# Patient Record
Sex: FEMALE | Race: BLACK OR AFRICAN AMERICAN | NOT HISPANIC OR LATINO | Employment: OTHER | URBAN - METROPOLITAN AREA
[De-identification: names, ages, dates, MRNs, and addresses within clinical notes are randomized per-mention and may not be internally consistent; named-entity substitution may affect disease eponyms.]

---

## 2023-10-23 PROCEDURE — 99285 EMERGENCY DEPT VISIT HI MDM: CPT | Performed by: EMERGENCY MEDICINE

## 2023-10-23 ASSESSMENT — PAIN SCALES - GENERAL: PAINLEVEL_OUTOF10: 8

## 2023-10-23 ASSESSMENT — PAIN - FUNCTIONAL ASSESSMENT: PAIN_FUNCTIONAL_ASSESSMENT: 0-10

## 2023-10-23 ASSESSMENT — PAIN DESCRIPTION - PAIN TYPE: TYPE: ACUTE PAIN

## 2023-10-23 ASSESSMENT — PAIN DESCRIPTION - ORIENTATION: ORIENTATION: RIGHT;LOWER

## 2023-10-23 ASSESSMENT — PAIN DESCRIPTION - LOCATION: LOCATION: ABDOMEN

## 2023-10-24 ENCOUNTER — APPOINTMENT (OUTPATIENT)
Dept: RADIOLOGY | Facility: HOSPITAL | Age: 38
End: 2023-10-24
Payer: OTHER GOVERNMENT

## 2023-10-24 ENCOUNTER — HOSPITAL ENCOUNTER (OUTPATIENT)
Facility: HOSPITAL | Age: 38
Setting detail: OBSERVATION
Discharge: HOME | End: 2023-10-28
Attending: EMERGENCY MEDICINE | Admitting: SURGERY
Payer: OTHER GOVERNMENT

## 2023-10-24 DIAGNOSIS — N83.201 HEMORRHAGIC CYST OF RIGHT OVARY: ICD-10-CM

## 2023-10-24 DIAGNOSIS — N30.00 ACUTE CYSTITIS WITHOUT HEMATURIA: ICD-10-CM

## 2023-10-24 DIAGNOSIS — K35.80 ACUTE APPENDICITIS, UNSPECIFIED ACUTE APPENDICITIS TYPE: Primary | ICD-10-CM

## 2023-10-24 LAB
ALBUMIN SERPL BCP-MCNC: 4.6 G/DL (ref 3.4–5)
ALP SERPL-CCNC: 59 U/L (ref 33–110)
ALT SERPL W P-5'-P-CCNC: 16 U/L (ref 7–45)
ANION GAP SERPL CALC-SCNC: 15 MMOL/L (ref 10–20)
APPEARANCE UR: ABNORMAL
AST SERPL W P-5'-P-CCNC: 17 U/L (ref 9–39)
BACTERIA #/AREA URNS AUTO: ABNORMAL /HPF
BILIRUB SERPL-MCNC: 0.9 MG/DL (ref 0–1.2)
BILIRUB UR STRIP.AUTO-MCNC: NEGATIVE MG/DL
BUN SERPL-MCNC: 6 MG/DL (ref 6–23)
CALCIUM SERPL-MCNC: 9.9 MG/DL (ref 8.6–10.3)
CHLORIDE SERPL-SCNC: 102 MMOL/L (ref 98–107)
CO2 SERPL-SCNC: 22 MMOL/L (ref 21–32)
COLOR UR: YELLOW
CREAT SERPL-MCNC: 0.63 MG/DL (ref 0.5–1.05)
ERYTHROCYTE [DISTWIDTH] IN BLOOD BY AUTOMATED COUNT: 15.9 % (ref 11.5–14.5)
GFR SERPL CREATININE-BSD FRML MDRD: >90 ML/MIN/1.73M*2
GLUCOSE SERPL-MCNC: 130 MG/DL (ref 74–99)
GLUCOSE UR STRIP.AUTO-MCNC: NEGATIVE MG/DL
HCG UR QL IA.RAPID: NEGATIVE
HCT VFR BLD AUTO: 33.2 % (ref 36–46)
HGB BLD-MCNC: 10.8 G/DL (ref 12–16)
KETONES UR STRIP.AUTO-MCNC: ABNORMAL MG/DL
LACTATE SERPL-SCNC: 2 MMOL/L (ref 0.4–2)
LEUKOCYTE ESTERASE UR QL STRIP.AUTO: NEGATIVE
MCH RBC QN AUTO: 27.3 PG (ref 26–34)
MCHC RBC AUTO-ENTMCNC: 32.5 G/DL (ref 32–36)
MCV RBC AUTO: 84 FL (ref 80–100)
MUCOUS THREADS #/AREA URNS AUTO: ABNORMAL /LPF
NITRITE UR QL STRIP.AUTO: POSITIVE
NRBC BLD-RTO: 0 /100 WBCS (ref 0–0)
PH UR STRIP.AUTO: 5 [PH]
PLATELET # BLD AUTO: 243 X10*3/UL (ref 150–450)
PMV BLD AUTO: 11.4 FL (ref 7.5–11.5)
POTASSIUM SERPL-SCNC: 3.7 MMOL/L (ref 3.5–5.3)
PROT SERPL-MCNC: 8.2 G/DL (ref 6.4–8.2)
PROT UR STRIP.AUTO-MCNC: NEGATIVE MG/DL
RBC # BLD AUTO: 3.96 X10*6/UL (ref 4–5.2)
RBC # UR STRIP.AUTO: NEGATIVE /UL
RBC #/AREA URNS AUTO: ABNORMAL /HPF
SODIUM SERPL-SCNC: 135 MMOL/L (ref 136–145)
SP GR UR STRIP.AUTO: 1.01
SQUAMOUS #/AREA URNS AUTO: ABNORMAL /HPF
UROBILINOGEN UR STRIP.AUTO-MCNC: <2 MG/DL
WBC # BLD AUTO: 12.8 X10*3/UL (ref 4.4–11.3)
WBC #/AREA URNS AUTO: ABNORMAL /HPF

## 2023-10-24 PROCEDURE — 96376 TX/PRO/DX INJ SAME DRUG ADON: CPT

## 2023-10-24 PROCEDURE — 74177 CT ABD & PELVIS W/CONTRAST: CPT | Performed by: RADIOLOGY

## 2023-10-24 PROCEDURE — 85027 COMPLETE CBC AUTOMATED: CPT

## 2023-10-24 PROCEDURE — 81025 URINE PREGNANCY TEST: CPT

## 2023-10-24 PROCEDURE — 96374 THER/PROPH/DIAG INJ IV PUSH: CPT

## 2023-10-24 PROCEDURE — 80053 COMPREHEN METABOLIC PANEL: CPT

## 2023-10-24 PROCEDURE — 36415 COLL VENOUS BLD VENIPUNCTURE: CPT

## 2023-10-24 PROCEDURE — 2500000004 HC RX 250 GENERAL PHARMACY W/ HCPCS (ALT 636 FOR OP/ED): Performed by: SURGERY

## 2023-10-24 PROCEDURE — 99223 1ST HOSP IP/OBS HIGH 75: CPT | Performed by: SURGERY

## 2023-10-24 PROCEDURE — 2500000004 HC RX 250 GENERAL PHARMACY W/ HCPCS (ALT 636 FOR OP/ED): Performed by: EMERGENCY MEDICINE

## 2023-10-24 PROCEDURE — 96361 HYDRATE IV INFUSION ADD-ON: CPT

## 2023-10-24 PROCEDURE — 83605 ASSAY OF LACTIC ACID: CPT

## 2023-10-24 PROCEDURE — 74177 CT ABD & PELVIS W/CONTRAST: CPT | Mod: MG

## 2023-10-24 PROCEDURE — 2550000001 HC RX 255 CONTRASTS: Performed by: EMERGENCY MEDICINE

## 2023-10-24 PROCEDURE — G0378 HOSPITAL OBSERVATION PER HR: HCPCS

## 2023-10-24 PROCEDURE — 81001 URINALYSIS AUTO W/SCOPE: CPT

## 2023-10-24 PROCEDURE — 96375 TX/PRO/DX INJ NEW DRUG ADDON: CPT

## 2023-10-24 RX ORDER — ACETAMINOPHEN 325 MG/1
650 TABLET ORAL EVERY 6 HOURS
Status: DISCONTINUED | OUTPATIENT
Start: 2023-10-24 | End: 2023-10-28 | Stop reason: HOSPADM

## 2023-10-24 RX ORDER — ONDANSETRON HYDROCHLORIDE 2 MG/ML
4 INJECTION, SOLUTION INTRAVENOUS EVERY 8 HOURS PRN
Status: DISCONTINUED | OUTPATIENT
Start: 2023-10-24 | End: 2023-10-28 | Stop reason: HOSPADM

## 2023-10-24 RX ORDER — MORPHINE SULFATE 4 MG/ML
4 INJECTION, SOLUTION INTRAMUSCULAR; INTRAVENOUS ONCE
Status: COMPLETED | OUTPATIENT
Start: 2023-10-24 | End: 2023-10-24

## 2023-10-24 RX ORDER — ONDANSETRON HYDROCHLORIDE 2 MG/ML
4 INJECTION, SOLUTION INTRAVENOUS ONCE
Status: COMPLETED | OUTPATIENT
Start: 2023-10-24 | End: 2023-10-24

## 2023-10-24 RX ORDER — DEXTROSE MONOHYDRATE AND SODIUM CHLORIDE 5; .45 G/100ML; G/100ML
50 INJECTION, SOLUTION INTRAVENOUS CONTINUOUS
Status: DISCONTINUED | OUTPATIENT
Start: 2023-10-24 | End: 2023-10-25

## 2023-10-24 RX ORDER — MORPHINE SULFATE 2 MG/ML
2 INJECTION, SOLUTION INTRAMUSCULAR; INTRAVENOUS EVERY 2 HOUR PRN
Status: DISCONTINUED | OUTPATIENT
Start: 2023-10-24 | End: 2023-10-25

## 2023-10-24 RX ORDER — ONDANSETRON 4 MG/1
4 TABLET, FILM COATED ORAL EVERY 8 HOURS PRN
Status: DISCONTINUED | OUTPATIENT
Start: 2023-10-24 | End: 2023-10-28 | Stop reason: HOSPADM

## 2023-10-24 RX ORDER — NALOXONE HYDROCHLORIDE 0.4 MG/ML
0.2 INJECTION, SOLUTION INTRAMUSCULAR; INTRAVENOUS; SUBCUTANEOUS EVERY 5 MIN PRN
Status: DISCONTINUED | OUTPATIENT
Start: 2023-10-24 | End: 2023-10-28 | Stop reason: HOSPADM

## 2023-10-24 RX ORDER — MORPHINE SULFATE 4 MG/ML
4 INJECTION, SOLUTION INTRAMUSCULAR; INTRAVENOUS EVERY 2 HOUR PRN
Status: DISCONTINUED | OUTPATIENT
Start: 2023-10-24 | End: 2023-10-25

## 2023-10-24 RX ORDER — POLYETHYLENE GLYCOL 3350 17 G/17G
17 POWDER, FOR SOLUTION ORAL DAILY
Status: DISCONTINUED | OUTPATIENT
Start: 2023-10-24 | End: 2023-10-28 | Stop reason: HOSPADM

## 2023-10-24 RX ADMIN — MORPHINE SULFATE 2 MG: 2 INJECTION, SOLUTION INTRAMUSCULAR; INTRAVENOUS at 15:14

## 2023-10-24 RX ADMIN — DEXTROSE AND SODIUM CHLORIDE 100 ML/HR: 5; 450 INJECTION, SOLUTION INTRAVENOUS at 14:26

## 2023-10-24 RX ADMIN — CEFOXITIN SODIUM 2 G: 2 POWDER, FOR SOLUTION INTRAVENOUS at 21:32

## 2023-10-24 RX ADMIN — MORPHINE SULFATE 4 MG: 4 INJECTION, SOLUTION INTRAMUSCULAR; INTRAVENOUS at 01:26

## 2023-10-24 RX ADMIN — ONDANSETRON 4 MG: 2 INJECTION INTRAMUSCULAR; INTRAVENOUS at 01:26

## 2023-10-24 RX ADMIN — CEFOXITIN SODIUM 2 G: 2 POWDER, FOR SOLUTION INTRAVENOUS at 03:18

## 2023-10-24 RX ADMIN — CEFOXITIN SODIUM 2 G: 2 POWDER, FOR SOLUTION INTRAVENOUS at 09:01

## 2023-10-24 RX ADMIN — SODIUM CHLORIDE 1000 ML: 9 INJECTION, SOLUTION INTRAVENOUS at 00:49

## 2023-10-24 RX ADMIN — MORPHINE SULFATE 2 MG: 2 INJECTION, SOLUTION INTRAMUSCULAR; INTRAVENOUS at 08:58

## 2023-10-24 RX ADMIN — ACETAMINOPHEN 650 MG: 325 TABLET ORAL at 04:00

## 2023-10-24 RX ADMIN — ACETAMINOPHEN 650 MG: 325 TABLET ORAL at 09:01

## 2023-10-24 RX ADMIN — ACETAMINOPHEN 650 MG: 325 TABLET ORAL at 21:32

## 2023-10-24 RX ADMIN — CEFOXITIN SODIUM 2 G: 2 POWDER, FOR SOLUTION INTRAVENOUS at 14:26

## 2023-10-24 RX ADMIN — ONDANSETRON 4 MG: 2 INJECTION INTRAMUSCULAR; INTRAVENOUS at 09:15

## 2023-10-24 RX ADMIN — DEXTROSE AND SODIUM CHLORIDE 100 ML/HR: 5; 450 INJECTION, SOLUTION INTRAVENOUS at 04:00

## 2023-10-24 RX ADMIN — MORPHINE SULFATE 2 MG: 2 INJECTION, SOLUTION INTRAMUSCULAR; INTRAVENOUS at 04:00

## 2023-10-24 RX ADMIN — MORPHINE SULFATE 2 MG: 2 INJECTION, SOLUTION INTRAMUSCULAR; INTRAVENOUS at 21:31

## 2023-10-24 RX ADMIN — ACETAMINOPHEN 650 MG: 325 TABLET ORAL at 15:11

## 2023-10-24 RX ADMIN — IOHEXOL 75 ML: 350 INJECTION, SOLUTION INTRAVENOUS at 02:03

## 2023-10-24 SDOH — SOCIAL STABILITY: SOCIAL INSECURITY: DO YOU FEEL ANYONE HAS EXPLOITED OR TAKEN ADVANTAGE OF YOU FINANCIALLY OR OF YOUR PERSONAL PROPERTY?: NO

## 2023-10-24 SDOH — HEALTH STABILITY: MENTAL HEALTH: HOW OFTEN DO YOU HAVE 6 OR MORE DRINKS ON ONE OCCASION?: LESS THAN MONTHLY

## 2023-10-24 SDOH — HEALTH STABILITY: MENTAL HEALTH: HOW MANY STANDARD DRINKS CONTAINING ALCOHOL DO YOU HAVE ON A TYPICAL DAY?: 1 OR 2

## 2023-10-24 SDOH — ECONOMIC STABILITY: INCOME INSECURITY: IN THE PAST 12 MONTHS, HAS THE ELECTRIC, GAS, OIL, OR WATER COMPANY THREATENED TO SHUT OFF SERVICE IN YOUR HOME?: NO

## 2023-10-24 SDOH — SOCIAL STABILITY: SOCIAL INSECURITY: ABUSE: ADULT

## 2023-10-24 SDOH — HEALTH STABILITY: MENTAL HEALTH: HOW OFTEN DO YOU HAVE A DRINK CONTAINING ALCOHOL?: 2-3 TIMES A WEEK

## 2023-10-24 SDOH — HEALTH STABILITY: MENTAL HEALTH: HOW OFTEN DO YOU HAVE 6 OR MORE DRINKS ON ONE OCCASION?: MONTHLY

## 2023-10-24 SDOH — SOCIAL STABILITY: SOCIAL INSECURITY: ARE YOU OR HAVE YOU BEEN THREATENED OR ABUSED PHYSICALLY, EMOTIONALLY, OR SEXUALLY BY ANYONE?: NO

## 2023-10-24 SDOH — SOCIAL STABILITY: SOCIAL INSECURITY: HAVE YOU HAD THOUGHTS OF HARMING ANYONE ELSE?: NO

## 2023-10-24 SDOH — SOCIAL STABILITY: SOCIAL INSECURITY: HAS ANYONE EVER THREATENED TO HURT YOUR FAMILY OR YOUR PETS?: NO

## 2023-10-24 SDOH — SOCIAL STABILITY: SOCIAL INSECURITY: DOES ANYONE TRY TO KEEP YOU FROM HAVING/CONTACTING OTHER FRIENDS OR DOING THINGS OUTSIDE YOUR HOME?: NO

## 2023-10-24 SDOH — SOCIAL STABILITY: SOCIAL INSECURITY: ARE THERE ANY APPARENT SIGNS OF INJURIES/BEHAVIORS THAT COULD BE RELATED TO ABUSE/NEGLECT?: NO

## 2023-10-24 SDOH — SOCIAL STABILITY: SOCIAL INSECURITY: WERE YOU ABLE TO COMPLETE ALL THE BEHAVIORAL HEALTH SCREENINGS?: YES

## 2023-10-24 SDOH — SOCIAL STABILITY: SOCIAL INSECURITY: DO YOU FEEL UNSAFE GOING BACK TO THE PLACE WHERE YOU ARE LIVING?: NO

## 2023-10-24 ASSESSMENT — COLUMBIA-SUICIDE SEVERITY RATING SCALE - C-SSRS
1. IN THE PAST MONTH, HAVE YOU WISHED YOU WERE DEAD OR WISHED YOU COULD GO TO SLEEP AND NOT WAKE UP?: NO
6. HAVE YOU EVER DONE ANYTHING, STARTED TO DO ANYTHING, OR PREPARED TO DO ANYTHING TO END YOUR LIFE?: NO
2. HAVE YOU ACTUALLY HAD ANY THOUGHTS OF KILLING YOURSELF?: NO

## 2023-10-24 ASSESSMENT — COGNITIVE AND FUNCTIONAL STATUS - GENERAL
DAILY ACTIVITIY SCORE: 24
MOBILITY SCORE: 24
PATIENT BASELINE BEDBOUND: NO
DAILY ACTIVITIY SCORE: 24

## 2023-10-24 ASSESSMENT — ACTIVITIES OF DAILY LIVING (ADL)
HEARING - RIGHT EAR: FUNCTIONAL
DRESSING YOURSELF: INDEPENDENT
BATHING: INDEPENDENT
TOILETING: INDEPENDENT
ADEQUATE_TO_COMPLETE_ADL: YES
FEEDING YOURSELF: INDEPENDENT
JUDGMENT_ADEQUATE_SAFELY_COMPLETE_DAILY_ACTIVITIES: YES
LACK_OF_TRANSPORTATION: NO
GROOMING: INDEPENDENT
PATIENT'S MEMORY ADEQUATE TO SAFELY COMPLETE DAILY ACTIVITIES?: YES
WALKS IN HOME: INDEPENDENT
HEARING - LEFT EAR: FUNCTIONAL
LACK_OF_TRANSPORTATION: NO

## 2023-10-24 ASSESSMENT — ENCOUNTER SYMPTOMS
ABDOMINAL PAIN: 1
HEADACHES: 0
CHILLS: 0
PALPITATIONS: 0
APPETITE CHANGE: 1
FEVER: 0
BLOOD IN STOOL: 0
DIARRHEA: 0
DIZZINESS: 0
COUGH: 0
NAUSEA: 1
SHORTNESS OF BREATH: 0
CONSTIPATION: 0
VOMITING: 1

## 2023-10-24 ASSESSMENT — PAIN SCALES - GENERAL
PAINLEVEL_OUTOF10: 2
PAINLEVEL_OUTOF10: 7
PAINLEVEL_OUTOF10: 6
PAINLEVEL_OUTOF10: 5 - MODERATE PAIN
PAINLEVEL_OUTOF10: 6
PAINLEVEL_OUTOF10: 6

## 2023-10-24 ASSESSMENT — PAIN - FUNCTIONAL ASSESSMENT
PAIN_FUNCTIONAL_ASSESSMENT: 0-10

## 2023-10-24 ASSESSMENT — PATIENT HEALTH QUESTIONNAIRE - PHQ9
1. LITTLE INTEREST OR PLEASURE IN DOING THINGS: NOT AT ALL
SUM OF ALL RESPONSES TO PHQ9 QUESTIONS 1 & 2: 0
2. FEELING DOWN, DEPRESSED OR HOPELESS: NOT AT ALL

## 2023-10-24 ASSESSMENT — LIFESTYLE VARIABLES
HOW OFTEN DO YOU HAVE A DRINK CONTAINING ALCOHOL: 2-3 TIMES A WEEK
AUDIT-C TOTAL SCORE: 4
HOW OFTEN DO YOU HAVE 6 OR MORE DRINKS ON ONE OCCASION: LESS THAN MONTHLY
AUDIT-C TOTAL SCORE: 5
AUDIT-C TOTAL SCORE: 4
SKIP TO QUESTIONS 9-10: 0
SKIP TO QUESTIONS 9-10: 0
AUDIT-C TOTAL SCORE: 4
SKIP TO QUESTIONS 9-10: 0
HOW MANY STANDARD DRINKS CONTAINING ALCOHOL DO YOU HAVE ON A TYPICAL DAY: 1 OR 2

## 2023-10-24 NOTE — ED PROVIDER NOTES
HPI   Chief Complaint   Patient presents with    Abdominal Pain     Patient states RLQ pain since this am that radiates up into her chest.  Unable to keep anything down, including fluid.       Patient presents to the emergency department secondary to abdominal pain.  Patient developed diffuse abdominal discomfort around 11:00 today.  She tried to eat lunch but then had nausea and vomiting.  She has not eaten solid since then but she continues to have nausea and vomiting with water and 7-Up.  The pain now seems to localize to the right lower quadrant.  She has had chills but no documented fever.  No history of similar symptoms.  No sick contacts.  No chest pain or shortness of breath.  No cough or congestion.  No other complaints at this time.  Patient has had 2 C-sections but no other abdominal surgeries.                          No data recorded                Patient History   No past medical history on file.  No past surgical history on file.  No family history on file.  Social History     Tobacco Use    Smoking status: Not on file    Smokeless tobacco: Not on file   Substance Use Topics    Alcohol use: Not on file    Drug use: Not on file       Physical Exam   ED Triage Vitals [10/23/23 2359]   Temp Heart Rate Resp BP   36.9 °C (98.5 °F) 97 16 132/88      SpO2 Temp src Heart Rate Source Patient Position   100 % -- -- --      BP Location FiO2 (%)     -- --       Physical Exam  Vitals and nursing note reviewed.   Constitutional:       Appearance: Normal appearance. She is well-developed.   HENT:      Head: Normocephalic and atraumatic.      Right Ear: Tympanic membrane normal.      Left Ear: Tympanic membrane normal.      Nose: Nose normal.      Mouth/Throat:      Mouth: Mucous membranes are moist.   Eyes:      Extraocular Movements: Extraocular movements intact.      Pupils: Pupils are equal, round, and reactive to light.   Cardiovascular:      Rate and Rhythm: Normal rate and regular rhythm.      Pulses: Normal  pulses.      Heart sounds: Normal heart sounds.   Pulmonary:      Effort: Pulmonary effort is normal.      Breath sounds: Normal breath sounds.   Abdominal:      General: Abdomen is flat. Bowel sounds are normal.      Palpations: Abdomen is soft.      Tenderness: There is abdominal tenderness in the right lower quadrant. There is no guarding or rebound.   Musculoskeletal:         General: Normal range of motion.   Skin:     General: Skin is warm and dry.      Capillary Refill: Capillary refill takes less than 2 seconds.   Neurological:      General: No focal deficit present.      Mental Status: She is alert and oriented to person, place, and time.   Psychiatric:         Mood and Affect: Mood normal.         Behavior: Behavior normal.       Labs Reviewed   CBC - Abnormal       Result Value    WBC 12.8 (*)     nRBC 0.0      RBC 3.96 (*)     Hemoglobin 10.8 (*)     Hematocrit 33.2 (*)     MCV 84      MCH 27.3      MCHC 32.5      RDW 15.9 (*)     Platelets 243      MPV 11.4     COMPREHENSIVE METABOLIC PANEL - Abnormal    Glucose 130 (*)     Sodium 135 (*)     Potassium 3.7      Chloride 102      Bicarbonate 22      Anion Gap 15      Urea Nitrogen 6      Creatinine 0.63      eGFR >90      Calcium 9.9      Albumin 4.6      Alkaline Phosphatase 59      Total Protein 8.2      AST 17      Bilirubin, Total 0.9      ALT 16     URINALYSIS WITH REFLEX MICROSCOPIC - Abnormal    Color, Urine Yellow      Appearance, Urine Hazy (*)     Specific Gravity, Urine 1.011      pH, Urine 5.0      Protein, Urine NEGATIVE      Glucose, Urine NEGATIVE      Blood, Urine NEGATIVE      Ketones, Urine 5 (TRACE) (*)     Bilirubin, Urine NEGATIVE      Urobilinogen, Urine <2.0      Nitrite, Urine POSITIVE (*)     Leukocyte Esterase, Urine NEGATIVE     MICROSCOPIC ONLY, URINE - Abnormal    WBC, Urine 6-10 (*)     RBC, Urine NONE      Squamous Epithelial Cells, Urine 10-25 (FEW)      Bacteria, Urine 4+ (*)     Mucus, Urine 3+     LACTATE - Normal     Lactate 2.0      Narrative:     Venipuncture immediately after or during the administration of Metamizole may lead to falsely low results. Testing should be performed immediately  prior to Metamizole dosing.   HCG, URINE, QUALITATIVE - Normal    HCG, Urine NEGATIVE       Pain Management Panel           No data to display              CT abdomen pelvis w IV contrast   Final Result   CT findings compatible with acute appendicitis. The appendix is   dilated in size measuring up to 1.3 cm with adjacent inflammatory   stranding.        2 round hypodensities present in the right lower quadrant are likely   ovarian in origin measuring up to approximately 3.2 cm and measure   intermediate density. Findings may represent hemorrhagic cysts with a   small amount of free pelvic fluid.        Concentric bladder wall thickening. Recommend correlation with   urinalysis as findings may be seen with cystitis.        MACRO:   None.        Signed by: Evan Finkelstein 10/24/2023 2:16 AM   Dictation workstation:   [id number]        ED Course & MDM   Diagnoses as of 10/24/23 0303   Acute appendicitis, unspecified acute appendicitis type   Hemorrhagic cyst of right ovary   Acute cystitis without hematuria       Medical Decision Making  Patient presents to the emergency department secondary to abdominal pain.  Differential diagnosis for this patient is appendicitis, gastroenteritis, ectopic pregnancy, urinary tract infection or other acute cause.  Patient is evaluated in the emergency department with laboratory work-up and CT scan of the abdomen pelvis with IV contrast.  She is given 4 mg of IV Zofran and 4 mg of IV morphine for pain and nausea.  She is given 1 L of normal saline bolus.  Patient's laboratory work-up shows a mild leukocytosis.  Urinalysis shows positive nitrates with 6-10 white blood cells and bacteria present.  CT scan shows evidence of acute appendicitis with concentric bladder wall thickening concerning for cystitis.   There is also presence of possible hemorrhagic cyst in the right ovary.  Patient is treated with 2 g of Mefoxin.  Patient was discussed with Dr. Malik from general surgery.  Patient is admitted to her service for antibiotics.  Discussion will be had in the morning about patient's multiple etiologies of possible pain.  Patient is agreeable to this plan of care.  She is made n.p.o. until after decision is made whether or not she will be going to the operating room.        Procedure  Procedures     Chelsea Worthington MD  10/24/23 0302       Chelsea Worthington MD  10/24/23 0303

## 2023-10-24 NOTE — H&P
"GENERAL SURGERY HISTORY AND PHYSICAL    Nilda Trejo   1985   [id number]     History Of Present Illness  Nilda Trejo is a 38 y.o. female presenting with abdominal pain, nausea and vomiting since yesterday at 1200. The pain was diffuse and eventually localized to the RLQ, but is occasionally diffuse. Her last PO intake was yesterday morning and last BM was yesterday 2pm.     The patient was supposed to start [employer] school today (1st day).      Past Medical History  She has no past medical history on file.    Surgical History  C section x2    Medications  No current facility-administered medications on file prior to encounter.     No current outpatient medications on file prior to encounter.       Allergies  Spinach     Social History  She reports that she has never smoked. She has never used smokeless tobacco. No history on file for alcohol use and drug use.  History of alcohol abuse with liquor. More recently, she drinks 1-2 glasses of wine a day and denies withdrawal symptoms if she stops drinking for several days.    Family History  No family history on file.     Review of Systems   Constitutional:  Positive for appetite change. Negative for chills and fever.   Respiratory:  Negative for cough and shortness of breath.    Cardiovascular:  Negative for chest pain and palpitations.   Gastrointestinal:  Positive for abdominal pain, nausea and vomiting. Negative for blood in stool, constipation and diarrhea.   Neurological:  Negative for dizziness and headaches.   All other systems reviewed and are negative.      Last Recorded Vitals  Blood pressure 113/67, pulse 68, temperature 36.6 °C (97.8 °F), temperature source Temporal, resp. rate 14, height 1.651 m (5' 5\"), weight 81 kg (178 lb 9.2 oz), SpO2 96 %.     Physical Exam  Constitutional:       General: She is not in acute distress.     Appearance: Normal appearance. She is not ill-appearing.   HENT:      Head: Normocephalic and atraumatic.   Cardiovascular: "      Rate and Rhythm: Normal rate and regular rhythm.   Pulmonary:      Effort: Pulmonary effort is normal. No respiratory distress.      Breath sounds: Normal breath sounds.   Abdominal:      General: There is no distension.      Palpations: Abdomen is soft.      Tenderness: There is abdominal tenderness. There is no guarding.      Comments: RLQ tenderness. + Rovsing   Musculoskeletal:         General: No swelling.   Skin:     General: Skin is warm and dry.   Neurological:      Mental Status: She is alert and oriented to person, place, and time. Mental status is at baseline.   Psychiatric:         Mood and Affect: Mood normal.         Behavior: Behavior normal.          Relevant Results  Results for orders placed or performed during the hospital encounter of 10/24/23 (from the past 24 hour(s))   CBC   Result Value Ref Range    WBC 12.8 (H) 4.4 - 11.3 x10*3/uL    nRBC 0.0 0.0 - 0.0 /100 WBCs    RBC 3.96 (L) 4.00 - 5.20 x10*6/uL    Hemoglobin 10.8 (L) 12.0 - 16.0 g/dL    Hematocrit 33.2 (L) 36.0 - 46.0 %    MCV 84 80 - 100 fL    MCH 27.3 26.0 - 34.0 pg    MCHC 32.5 32.0 - 36.0 g/dL    RDW 15.9 (H) 11.5 - 14.5 %    Platelets 243 150 - 450 x10*3/uL    MPV 11.4 7.5 - 11.5 fL   Comprehensive metabolic panel   Result Value Ref Range    Glucose 130 (H) 74 - 99 mg/dL    Sodium 135 (L) 136 - 145 mmol/L    Potassium 3.7 3.5 - 5.3 mmol/L    Chloride 102 98 - 107 mmol/L    Bicarbonate 22 21 - 32 mmol/L    Anion Gap 15 10 - 20 mmol/L    Urea Nitrogen 6 6 - 23 mg/dL    Creatinine 0.63 0.50 - 1.05 mg/dL    eGFR >90 >60 mL/min/1.73m*2    Calcium 9.9 8.6 - 10.3 mg/dL    Albumin 4.6 3.4 - 5.0 g/dL    Alkaline Phosphatase 59 33 - 110 U/L    Total Protein 8.2 6.4 - 8.2 g/dL    AST 17 9 - 39 U/L    Bilirubin, Total 0.9 0.0 - 1.2 mg/dL    ALT 16 7 - 45 U/L   Lactate   Result Value Ref Range    Lactate 2.0 0.4 - 2.0 mmol/L   Urinalysis with Reflex Microscopic   Result Value Ref Range    Color, Urine Yellow Straw, Yellow    Appearance,  Urine Hazy (N) Clear    Specific Gravity, Urine 1.011 1.005 - 1.035    pH, Urine 5.0 5.0, 5.5, 6.0, 6.5, 7.0, 7.5, 8.0    Protein, Urine NEGATIVE NEGATIVE mg/dL    Glucose, Urine NEGATIVE NEGATIVE mg/dL    Blood, Urine NEGATIVE NEGATIVE    Ketones, Urine 5 (TRACE) (A) NEGATIVE mg/dL    Bilirubin, Urine NEGATIVE NEGATIVE    Urobilinogen, Urine <2.0 <2.0 mg/dL    Nitrite, Urine POSITIVE (A) NEGATIVE    Leukocyte Esterase, Urine NEGATIVE NEGATIVE   hCG, Urine, Qualitative   Result Value Ref Range    HCG, Urine NEGATIVE NEGATIVE   Microscopic Only, Urine   Result Value Ref Range    WBC, Urine 6-10 (A) 1-5, NONE /HPF    RBC, Urine NONE NONE, 1-2, 3-5 /HPF    Squamous Epithelial Cells, Urine 10-25 (FEW) Reference range not established. /HPF    Bacteria, Urine 4+ (A) NONE SEEN /HPF    Mucus, Urine 3+ Reference range not established. /LPF       CT abdomen pelvis w IV contrast    Result Date: 10/24/2023  Interpreted By:  Finkelstein, Evan, STUDY: CT ABDOMEN PELVIS W IV CONTRAST;  10/24/2023 2:03 am   INDICATION: Signs/Symptoms:right lower quadrant pain.   COMPARISON: None.   ACCESSION NUMBER(S): [id number]   ORDERING CLINICIAN: YONATAN ROMAN   TECHNIQUE: Axial CT images of the abdomen and pelvis with coronal and sagittal reconstructed images obtained after intravenous administration of 75 mL Omnipaque 350   FINDINGS: LOWER CHEST: No acute abnormality of the lung bases.   ABDOMEN:   LIVER: Normal attenuation and contour. BILE DUCTS: Normal caliber. GALLBLADDER: No calcified gallstones. No wall thickening. PORTAL VEIN: Patent SPLEEN: Unremarkable. PANCREAS: Unremarkable. ADRENALS: Unremarkable. KIDNEYS, URETERS and URINARY BLADDER: Symmetric renal enhancement. No hydronephrosis or perinephric fluid collection. Subcentimeter hypodensities in the right kidney are too small to characterize. Concentric bladder wall thickening. REPRODUCTIVE ORGANS: 2 rounded hypodensities are present in the right lower pelvis, possibly  ovarian in origin, both measuring up to approximately 3.2 cm and measuring intermediate density. There is a small amount of free pelvic fluid.   ABDOMINAL WALL: Within normal limits. PERITONEUM: No ascites, free air or fluid collection.   BOWEL: The stomach is decompressed, which limits evaluation.  The appendix is dilated in size measuring up to approximately 1.3 cm with adjacent inflammatory stranding.   VESSELS: No aortic aneurysm. RETROPERITONEUM: No pathologically enlarged retroperitoneal lymph nodes.   BONES: No acute osseous abnormality.       CT findings compatible with acute appendicitis. The appendix is dilated in size measuring up to 1.3 cm with adjacent inflammatory stranding.   2 round hypodensities present in the right lower quadrant are likely ovarian in origin measuring up to approximately 3.2 cm and measure intermediate density. Findings may represent hemorrhagic cysts with a small amount of free pelvic fluid.   Concentric bladder wall thickening. Recommend correlation with urinalysis as findings may be seen with cystitis.   MACRO: None.   Signed by: Evan Finkelstein 10/24/2023 2:16 AM Dictation workstation:   [id number]      Assessment and Plan  Principal Problem:    Acute appendicitis    38 y.o. female with abdominal pain with workup showing acute appendicitis, a hemorrhagic right ovarian cyst and a UTI. I discussed that her symptoms may be due to the cyst or the appendix, and it can occasionally be difficult to delineate. We discussed antibiotic therapy alone vs surgery - if we were to consider surgery, appendectomy would be performed even if it appeared normal to avoid future diagnostic confusion. I discussed that her imaging characteristics meet criteria for considering antibiotics alone. The patient spent some time considering the options and elected to proceed with nonoperative management. We will continue IV antibiotics, advance to a clear liquid diet, and continue to monitor for  now.    Christianne Malik MD, FACS  General Surgery     28.2

## 2023-10-24 NOTE — PROGRESS NOTES
10/24/23 0920   Discharge Planning   Living Arrangements Spouse/significant other   Support Systems Spouse/significant other   Assistance Needed Independent   Type of Residence Private residence   Home or Post Acute Services None   Patient expects to be discharged to: Home   Financial Resource Strain   How hard is it for you to pay for the very basics like food, housing, medical care, and heating? Somewhat   Housing Stability   In the last 12 months, was there a time when you were not able to pay the mortgage or rent on time? N   In the last 12 months, was there a time when you did not have a steady place to sleep or slept in a shelter (including now)? N   Transportation Needs   In the past 12 months, has lack of transportation kept you from medical appointments or from getting medications? no   In the past 12 months, has lack of transportation kept you from meetings, work, or from getting things needed for daily living? No     Patient plans to return home with no needs upon discharge.     [facility]

## 2023-10-24 NOTE — PROGRESS NOTES
Notified by [facility], pt without insurance, social work notified HRS via e-mail of pt needing Medicaid screening. Social Work to follow for any continued needs.     David Nielsen, MSW, LSW ([id number])   Care Transitions

## 2023-10-24 NOTE — CARE PLAN
Problem: Fall/Injury  Goal: Not fall by end of shift  Outcome: Progressing  Goal: Be free from injury by end of the shift  Outcome: Progressing  Goal: Verbalize understanding of personal risk factors for fall in the hospital  Outcome: Progressing  Goal: Verbalize understanding of risk factor reduction measures to prevent injury from fall in the home  Outcome: Progressing  Goal: Use assistive devices by end of the shift  Outcome: Progressing  Goal: Pace activities to prevent fatigue by end of the shift  Outcome: Progressing     Problem: Pain  Goal: Takes deep breaths with improved pain control throughout the shift  Outcome: Progressing  Goal: Turns in bed with improved pain control throughout the shift  Outcome: Progressing  Goal: Walks with improved pain control throughout the shift  Outcome: Progressing  Goal: Performs ADL's with improved pain control throughout shift  Outcome: Progressing  Goal: Participates in PT with improved pain control throughout the shift  Outcome: Progressing  Goal: Free from opioid side effects throughout the shift  Outcome: Progressing  Goal: Free from acute confusion related to pain meds throughout the shift  Outcome: Progressing   The patient's goals for the shift include  pain management     The clinical goals for the shift include pain management

## 2023-10-24 NOTE — CARE PLAN
The patient's goals for the shift include  managing pain    The clinical goals for the shift include patient will have controlled pain during shift    Over the shift, the patient did not make progress toward the following goals. Barriers to progression include N/A

## 2023-10-25 LAB
ANION GAP SERPL CALC-SCNC: 9 MMOL/L (ref 10–20)
BUN SERPL-MCNC: 2 MG/DL (ref 6–23)
CALCIUM SERPL-MCNC: 7.3 MG/DL (ref 8.6–10.3)
CHLORIDE SERPL-SCNC: 112 MMOL/L (ref 98–107)
CO2 SERPL-SCNC: 21 MMOL/L (ref 21–32)
CREAT SERPL-MCNC: 0.63 MG/DL (ref 0.5–1.05)
ERYTHROCYTE [DISTWIDTH] IN BLOOD BY AUTOMATED COUNT: 15.9 % (ref 11.5–14.5)
GFR SERPL CREATININE-BSD FRML MDRD: >90 ML/MIN/1.73M*2
GLUCOSE SERPL-MCNC: 85 MG/DL (ref 74–99)
HCT VFR BLD AUTO: 27 % (ref 36–46)
HGB BLD-MCNC: 8.5 G/DL (ref 12–16)
MCH RBC QN AUTO: 26.9 PG (ref 26–34)
MCHC RBC AUTO-ENTMCNC: 31.5 G/DL (ref 32–36)
MCV RBC AUTO: 85 FL (ref 80–100)
NRBC BLD-RTO: 0 /100 WBCS (ref 0–0)
PLATELET # BLD AUTO: 188 X10*3/UL (ref 150–450)
PMV BLD AUTO: 11.8 FL (ref 7.5–11.5)
POTASSIUM SERPL-SCNC: 2.9 MMOL/L (ref 3.5–5.3)
RBC # BLD AUTO: 3.16 X10*6/UL (ref 4–5.2)
SODIUM SERPL-SCNC: 139 MMOL/L (ref 136–145)
WBC # BLD AUTO: 3.5 X10*3/UL (ref 4.4–11.3)

## 2023-10-25 PROCEDURE — 99232 SBSQ HOSP IP/OBS MODERATE 35: CPT | Performed by: STUDENT IN AN ORGANIZED HEALTH CARE EDUCATION/TRAINING PROGRAM

## 2023-10-25 PROCEDURE — 2500000004 HC RX 250 GENERAL PHARMACY W/ HCPCS (ALT 636 FOR OP/ED): Performed by: SURGERY

## 2023-10-25 PROCEDURE — 36415 COLL VENOUS BLD VENIPUNCTURE: CPT | Performed by: SURGERY

## 2023-10-25 PROCEDURE — G0378 HOSPITAL OBSERVATION PER HR: HCPCS

## 2023-10-25 PROCEDURE — 85027 COMPLETE CBC AUTOMATED: CPT | Performed by: SURGERY

## 2023-10-25 PROCEDURE — 2500000001 HC RX 250 WO HCPCS SELF ADMINISTERED DRUGS (ALT 637 FOR MEDICARE OP): Performed by: SURGERY

## 2023-10-25 PROCEDURE — 80048 BASIC METABOLIC PNL TOTAL CA: CPT | Performed by: SURGERY

## 2023-10-25 RX ORDER — CHOLECALCIFEROL (VITAMIN D3) 50 MCG
50 TABLET ORAL DAILY
COMMUNITY

## 2023-10-25 RX ORDER — LORATADINE 10 MG/1
10 TABLET ORAL DAILY
COMMUNITY

## 2023-10-25 RX ORDER — OXYCODONE HYDROCHLORIDE 5 MG/1
5 TABLET ORAL EVERY 6 HOURS PRN
Qty: 8 TABLET | Refills: 0 | OUTPATIENT
Start: 2023-10-25 | End: 2023-10-27

## 2023-10-25 RX ORDER — POTASSIUM CHLORIDE 750 MG/1
40 TABLET, FILM COATED, EXTENDED RELEASE ORAL ONCE
Status: COMPLETED | OUTPATIENT
Start: 2023-10-25 | End: 2023-10-25

## 2023-10-25 RX ORDER — FERROUS SULFATE 325(65) MG
65 TABLET, DELAYED RELEASE (ENTERIC COATED) ORAL DAILY
COMMUNITY

## 2023-10-25 RX ORDER — OXYCODONE HYDROCHLORIDE 5 MG/1
5 TABLET ORAL EVERY 6 HOURS PRN
Status: DISCONTINUED | OUTPATIENT
Start: 2023-10-25 | End: 2023-10-28 | Stop reason: HOSPADM

## 2023-10-25 RX ORDER — MORPHINE SULFATE 2 MG/ML
2 INJECTION, SOLUTION INTRAMUSCULAR; INTRAVENOUS EVERY 4 HOURS PRN
Status: DISCONTINUED | OUTPATIENT
Start: 2023-10-25 | End: 2023-10-28 | Stop reason: HOSPADM

## 2023-10-25 RX ORDER — AMOXICILLIN AND CLAVULANATE POTASSIUM 875; 125 MG/1; MG/1
875 TABLET, FILM COATED ORAL 2 TIMES DAILY
Qty: 16 TABLET | Refills: 0 | OUTPATIENT
Start: 2023-10-25 | End: 2023-11-02

## 2023-10-25 RX ADMIN — ACETAMINOPHEN 650 MG: 325 TABLET ORAL at 21:46

## 2023-10-25 RX ADMIN — CEFOXITIN SODIUM 2 G: 2 POWDER, FOR SOLUTION INTRAVENOUS at 10:02

## 2023-10-25 RX ADMIN — ACETAMINOPHEN 650 MG: 325 TABLET ORAL at 15:30

## 2023-10-25 RX ADMIN — CEFOXITIN SODIUM 2 G: 2 POWDER, FOR SOLUTION INTRAVENOUS at 21:46

## 2023-10-25 RX ADMIN — DEXTROSE AND SODIUM CHLORIDE 100 ML/HR: 5; 450 INJECTION, SOLUTION INTRAVENOUS at 01:35

## 2023-10-25 RX ADMIN — ACETAMINOPHEN 650 MG: 325 TABLET ORAL at 10:02

## 2023-10-25 RX ADMIN — CEFOXITIN SODIUM 2 G: 2 POWDER, FOR SOLUTION INTRAVENOUS at 15:30

## 2023-10-25 RX ADMIN — POTASSIUM CHLORIDE 40 MEQ: 750 TABLET, FILM COATED, EXTENDED RELEASE ORAL at 14:20

## 2023-10-25 RX ADMIN — OXYCODONE HYDROCHLORIDE 5 MG: 5 TABLET ORAL at 19:33

## 2023-10-25 RX ADMIN — ACETAMINOPHEN 650 MG: 325 TABLET ORAL at 03:00

## 2023-10-25 RX ADMIN — MORPHINE SULFATE 2 MG: 2 INJECTION, SOLUTION INTRAMUSCULAR; INTRAVENOUS at 01:39

## 2023-10-25 RX ADMIN — MORPHINE SULFATE 2 MG: 2 INJECTION, SOLUTION INTRAMUSCULAR; INTRAVENOUS at 09:57

## 2023-10-25 RX ADMIN — CEFOXITIN SODIUM 2 G: 2 POWDER, FOR SOLUTION INTRAVENOUS at 03:00

## 2023-10-25 ASSESSMENT — PAIN - FUNCTIONAL ASSESSMENT
PAIN_FUNCTIONAL_ASSESSMENT: 0-10

## 2023-10-25 ASSESSMENT — ENCOUNTER SYMPTOMS
SORE THROAT: 0
ABDOMINAL PAIN: 1
LIGHT-HEADEDNESS: 0
DYSURIA: 0
CHEST TIGHTNESS: 0
JOINT SWELLING: 0
ARTHRALGIAS: 0
CHILLS: 0
CONSTIPATION: 0
SHORTNESS OF BREATH: 0
FEVER: 0
NAUSEA: 0
DIZZINESS: 0
DIARRHEA: 0

## 2023-10-25 ASSESSMENT — COGNITIVE AND FUNCTIONAL STATUS - GENERAL
MOBILITY SCORE: 24
DAILY ACTIVITIY SCORE: 24

## 2023-10-25 ASSESSMENT — PAIN SCALES - GENERAL
PAINLEVEL_OUTOF10: 6
PAINLEVEL_OUTOF10: 5 - MODERATE PAIN
PAINLEVEL_OUTOF10: 3

## 2023-10-25 NOTE — DISCHARGE SUMMARY
Discharge Diagnosis  Acute appendicitis    Issues Requiring Follow-Up  Acute appendicitis treated with antibiotics    Test Results Pending At Discharge  Pending Labs       No current pending labs.            Hospital Course   Patient is a 38-year-old female who presented to [facility] with abdominal pain.  Patient was found to have acute appendicitis as well as UTI and was admitted to the hospital for further management.  Due to patient's current educational situation, and the fact that she fit within indications for nonoperative management, patient elected antibiotic therapy alone.  She was initiated on cefoxitin in the hospital which was subsequently transitioned to Augmentin on discharge for coverage of both appendicitis as well as uncomplicated UTI.  Prior to discharge, patient's pain was well controlled, she was tolerating diet and she was back to her baseline.  She was instructed to call her surgeon or come to the ED if having worsening abdominal pain, intractable nausea or vomiting, fevers or chills or any other concerns.  She understood and was in agreement with the plan.    Discharge diet: Low fiber  Discharge activity: As tolerated  Discharge follow-up: Dr. Malik to be seen in 2 to 4 weeks    Patient was safe for discharge and was discharged home in stable condition    Pertinent Physical Exam At Time of Discharge  Physical Exam  Gen: NAD.  A&Ox3  HEENT: NC/AT.  Moist mucous membranes.  Neck: Normal range of motion.  CV: Regular rate.  Chest: Normal chest rise.  Normal respiratory effort.  Abdomen: Soft.  Appropriately tender to palpation. ND.  No rigidity or guarding.  Extremities: No edema.  Moving all extremities.      Home Medications     Medication List      ASK your doctor about these medications     ferrous sulfate 325 (65 Fe) MG EC tablet   lactobacillus acidophilus & bulgar 1 million cell chewable tablet;   Commonly known as: Lactinex   loratadine 10 mg tablet; Commonly known as: Claritin   Vitamin D3  50 MCG (2000 UT) tablet; Generic drug: cholecalciferol       Outpatient Follow-Up  No future appointments.    Juan Pritchard, DO

## 2023-10-25 NOTE — PROGRESS NOTES
[facility]. Spoke to patient and she does not want to transfer as she should be discharged tomorrow.

## 2023-10-25 NOTE — PROGRESS NOTES
"GENERAL SURGERY PROGRESS NOTE    Nilda Trejo   1985   [id number]     Nilda Trejo is a 38 y.o. female on day 0 of admission presenting with Acute appendicitis.      Subjective  Patient seen and assessed.  No acute events overnight.  States she feels much better.  Tolerating diet without issue and having bowel function.    Review of Systems   Constitutional:  Negative for chills and fever.   HENT:  Negative for drooling and sore throat.    Respiratory:  Negative for chest tightness and shortness of breath.    Cardiovascular:  Negative for chest pain.   Gastrointestinal:  Positive for abdominal pain (Improved). Negative for constipation, diarrhea and nausea.   Endocrine: Negative for polyuria.   Genitourinary:  Negative for dysuria.   Musculoskeletal:  Negative for arthralgias and joint swelling.   Neurological:  Negative for dizziness and light-headedness.       Objective    Last Recorded Vitals  Blood pressure 131/88, pulse 64, temperature 36.8 °C (98.3 °F), temperature source Temporal, resp. rate 17, height 1.651 m (5' 5\"), weight 81 kg (178 lb 9.2 oz), SpO2 99 %.    Intake/Output last 3 Shifts:  I/O last 3 completed shifts:  In: 2978.3 (36.8 mL/kg) [P.O.:480; I.V.:1398.3 (17.3 mL/kg); IV Piggyback:1100]  Out: 2100 (25.9 mL/kg) [Urine:2100 (0.7 mL/kg/hr)]  Weight: 81 kg     Gen: NAD.  A&Ox3  HEENT: NC/AT.  Moist mucous membranes.  Neck: Normal range of motion.  CV: Regular rate.  Chest: Normal chest rise.  Normal respiratory effort.  Abdomen: Soft.  Mildly tender to palpation in the right lower quadrant.  Nondistended..  No rigidity or guarding.  Extremities: No edema.  Moving all extremities.    Relevant Results  Results for orders placed or performed during the hospital encounter of 10/24/23 (from the past 24 hour(s))   CBC   Result Value Ref Range    WBC 3.5 (L) 4.4 - 11.3 x10*3/uL    nRBC 0.0 0.0 - 0.0 /100 WBCs    RBC 3.16 (L) 4.00 - 5.20 x10*6/uL    Hemoglobin 8.5 (L) 12.0 - 16.0 g/dL    Hematocrit " 27.0 (L) 36.0 - 46.0 %    MCV 85 80 - 100 fL    MCH 26.9 26.0 - 34.0 pg    MCHC 31.5 (L) 32.0 - 36.0 g/dL    RDW 15.9 (H) 11.5 - 14.5 %    Platelets 188 150 - 450 x10*3/uL    MPV 11.8 (H) 7.5 - 11.5 fL   Basic Metabolic Panel   Result Value Ref Range    Glucose 85 74 - 99 mg/dL    Sodium 139 136 - 145 mmol/L    Potassium 2.9 (LL) 3.5 - 5.3 mmol/L    Chloride 112 (H) 98 - 107 mmol/L    Bicarbonate 21 21 - 32 mmol/L    Anion Gap 9 (L) 10 - 20 mmol/L    Urea Nitrogen 2 (L) 6 - 23 mg/dL    Creatinine 0.63 0.50 - 1.05 mg/dL    eGFR >90 >60 mL/min/1.73m*2    Calcium 7.3 (L) 8.6 - 10.3 mg/dL       CT abdomen pelvis w IV contrast    Result Date: 10/24/2023  Interpreted By:  Finkelstein, Evan, STUDY: CT ABDOMEN PELVIS W IV CONTRAST;  10/24/2023 2:03 am   INDICATION: Signs/Symptoms:right lower quadrant pain.   COMPARISON: None.   ACCESSION NUMBER(S): [id number]   ORDERING CLINICIAN: YONATAN ROMAN   TECHNIQUE: Axial CT images of the abdomen and pelvis with coronal and sagittal reconstructed images obtained after intravenous administration of 75 mL Omnipaque 350   FINDINGS: LOWER CHEST: No acute abnormality of the lung bases.   ABDOMEN:   LIVER: Normal attenuation and contour. BILE DUCTS: Normal caliber. GALLBLADDER: No calcified gallstones. No wall thickening. PORTAL VEIN: Patent SPLEEN: Unremarkable. PANCREAS: Unremarkable. ADRENALS: Unremarkable. KIDNEYS, URETERS and URINARY BLADDER: Symmetric renal enhancement. No hydronephrosis or perinephric fluid collection. Subcentimeter hypodensities in the right kidney are too small to characterize. Concentric bladder wall thickening. REPRODUCTIVE ORGANS: 2 rounded hypodensities are present in the right lower pelvis, possibly ovarian in origin, both measuring up to approximately 3.2 cm and measuring intermediate density. There is a small amount of free pelvic fluid.   ABDOMINAL WALL: Within normal limits. PERITONEUM: No ascites, free air or fluid collection.   BOWEL: The  stomach is decompressed, which limits evaluation.  The appendix is dilated in size measuring up to approximately 1.3 cm with adjacent inflammatory stranding.   VESSELS: No aortic aneurysm. RETROPERITONEUM: No pathologically enlarged retroperitoneal lymph nodes.   BONES: No acute osseous abnormality.       CT findings compatible with acute appendicitis. The appendix is dilated in size measuring up to 1.3 cm with adjacent inflammatory stranding.   2 round hypodensities present in the right lower quadrant are likely ovarian in origin measuring up to approximately 3.2 cm and measure intermediate density. Findings may represent hemorrhagic cysts with a small amount of free pelvic fluid.   Concentric bladder wall thickening. Recommend correlation with urinalysis as findings may be seen with cystitis.   MACRO: None.   Signed by: Evan Finkelstein 10/24/2023 2:16 AM Dictation workstation:   [id number]      Assessment and Plan  Principal Problem:    Acute appendicitis    38 y.o. female with acute appendicitis.  Patient electing antibiotic management alone at this time.    Plan  -Continue IV cefoxitin for the time being until patient is discharged, when she is discharged she will be sent home on Augmentin for a total of 10-day regimen  -Patient now on low fiber diet, will monitor toleration  -Potassium repleted  -Patient likely okay for discharge tomorrow morning  -Monitor vitals    Juan Pritchard DO, PGY4  General Surgery

## 2023-10-26 LAB
ANION GAP SERPL CALC-SCNC: 9 MMOL/L (ref 10–20)
BUN SERPL-MCNC: 5 MG/DL (ref 6–23)
CALCIUM SERPL-MCNC: 8.7 MG/DL (ref 8.6–10.3)
CHLORIDE SERPL-SCNC: 109 MMOL/L (ref 98–107)
CO2 SERPL-SCNC: 23 MMOL/L (ref 21–32)
CREAT SERPL-MCNC: 0.7 MG/DL (ref 0.5–1.05)
ERYTHROCYTE [DISTWIDTH] IN BLOOD BY AUTOMATED COUNT: 16.2 % (ref 11.5–14.5)
GFR SERPL CREATININE-BSD FRML MDRD: >90 ML/MIN/1.73M*2
GLUCOSE SERPL-MCNC: 89 MG/DL (ref 74–99)
HCT VFR BLD AUTO: 27.9 % (ref 36–46)
HGB BLD-MCNC: 8.6 G/DL (ref 12–16)
MCH RBC QN AUTO: 26.6 PG (ref 26–34)
MCHC RBC AUTO-ENTMCNC: 30.8 G/DL (ref 32–36)
MCV RBC AUTO: 86 FL (ref 80–100)
NRBC BLD-RTO: 0 /100 WBCS (ref 0–0)
PLATELET # BLD AUTO: 197 X10*3/UL (ref 150–450)
PMV BLD AUTO: 13.7 FL (ref 7.5–11.5)
POTASSIUM SERPL-SCNC: 3.8 MMOL/L (ref 3.5–5.3)
RBC # BLD AUTO: 3.23 X10*6/UL (ref 4–5.2)
SODIUM SERPL-SCNC: 137 MMOL/L (ref 136–145)
WBC # BLD AUTO: 3.8 X10*3/UL (ref 4.4–11.3)

## 2023-10-26 PROCEDURE — 99232 SBSQ HOSP IP/OBS MODERATE 35: CPT | Performed by: SURGERY

## 2023-10-26 PROCEDURE — 85027 COMPLETE CBC AUTOMATED: CPT | Performed by: SURGERY

## 2023-10-26 PROCEDURE — 2500000001 HC RX 250 WO HCPCS SELF ADMINISTERED DRUGS (ALT 637 FOR MEDICARE OP): Performed by: SURGERY

## 2023-10-26 PROCEDURE — 2500000004 HC RX 250 GENERAL PHARMACY W/ HCPCS (ALT 636 FOR OP/ED): Performed by: SURGERY

## 2023-10-26 PROCEDURE — G0378 HOSPITAL OBSERVATION PER HR: HCPCS

## 2023-10-26 PROCEDURE — 36415 COLL VENOUS BLD VENIPUNCTURE: CPT | Performed by: SURGERY

## 2023-10-26 PROCEDURE — 80048 BASIC METABOLIC PNL TOTAL CA: CPT | Performed by: SURGERY

## 2023-10-26 PROCEDURE — 96376 TX/PRO/DX INJ SAME DRUG ADON: CPT

## 2023-10-26 RX ADMIN — CEFOXITIN SODIUM 2 G: 2 POWDER, FOR SOLUTION INTRAVENOUS at 21:33

## 2023-10-26 RX ADMIN — OXYCODONE HYDROCHLORIDE 5 MG: 5 TABLET ORAL at 05:34

## 2023-10-26 RX ADMIN — ACETAMINOPHEN 650 MG: 325 TABLET ORAL at 15:51

## 2023-10-26 RX ADMIN — CEFOXITIN SODIUM 2 G: 2 POWDER, FOR SOLUTION INTRAVENOUS at 09:58

## 2023-10-26 RX ADMIN — CEFOXITIN SODIUM 2 G: 2 POWDER, FOR SOLUTION INTRAVENOUS at 17:23

## 2023-10-26 RX ADMIN — ACETAMINOPHEN 650 MG: 325 TABLET ORAL at 09:06

## 2023-10-26 RX ADMIN — OXYCODONE HYDROCHLORIDE 5 MG: 5 TABLET ORAL at 11:34

## 2023-10-26 RX ADMIN — ACETAMINOPHEN 650 MG: 325 TABLET ORAL at 21:33

## 2023-10-26 RX ADMIN — CEFOXITIN SODIUM 2 G: 2 POWDER, FOR SOLUTION INTRAVENOUS at 04:18

## 2023-10-26 RX ADMIN — ACETAMINOPHEN 650 MG: 325 TABLET ORAL at 04:18

## 2023-10-26 RX ADMIN — MORPHINE SULFATE 2 MG: 2 INJECTION, SOLUTION INTRAMUSCULAR; INTRAVENOUS at 19:53

## 2023-10-26 ASSESSMENT — COGNITIVE AND FUNCTIONAL STATUS - GENERAL
DAILY ACTIVITIY SCORE: 24
MOBILITY SCORE: 24
MOBILITY SCORE: 24
DAILY ACTIVITIY SCORE: 24

## 2023-10-26 ASSESSMENT — ENCOUNTER SYMPTOMS
ABDOMINAL PAIN: 1
PALPITATIONS: 0
SHORTNESS OF BREATH: 0
HEADACHES: 0
COUGH: 0
DIZZINESS: 0
FEVER: 0
CHILLS: 0
NAUSEA: 0
VOMITING: 0

## 2023-10-26 ASSESSMENT — PAIN SCALES - GENERAL
PAINLEVEL_OUTOF10: 7
PAINLEVEL_OUTOF10: 0 - NO PAIN
PAINLEVEL_OUTOF10: 5 - MODERATE PAIN
PAINLEVEL_OUTOF10: 6
PAINLEVEL_OUTOF10: 7

## 2023-10-26 ASSESSMENT — PAIN - FUNCTIONAL ASSESSMENT
PAIN_FUNCTIONAL_ASSESSMENT: 0-10

## 2023-10-26 NOTE — CARE PLAN
Problem: Fall/Injury  Goal: Not fall by end of shift  Outcome: Progressing  Goal: Be free from injury by end of the shift  Outcome: Progressing  Goal: Verbalize understanding of personal risk factors for fall in the hospital  Outcome: Progressing  Goal: Verbalize understanding of risk factor reduction measures to prevent injury from fall in the home  Outcome: Progressing  Goal: Use assistive devices by end of the shift  Outcome: Progressing  Goal: Pace activities to prevent fatigue by end of the shift  Outcome: Progressing     Problem: Pain  Goal: Takes deep breaths with improved pain control throughout the shift  Outcome: Progressing  Goal: Turns in bed with improved pain control throughout the shift  Outcome: Progressing  Goal: Walks with improved pain control throughout the shift  Outcome: Progressing  Goal: Performs ADL's with improved pain control throughout shift  Outcome: Progressing  Goal: Participates in PT with improved pain control throughout the shift  Outcome: Progressing  Goal: Free from opioid side effects throughout the shift  Outcome: Progressing  Goal: Free from acute confusion related to pain meds throughout the shift  Outcome: Progressing      The clinical goals for the shift include pain management, tolerate diet ordered

## 2023-10-26 NOTE — PROGRESS NOTES
"GENERAL SURGERY PROGRESS NOTE    Nilda Trejo   1985   [id number]     Nilda Trejo is a 38 y.o. female on day 0 of admission presenting with Acute appendicitis.      Subjective  The patient feels somewhat improved but still c/o abdominal pain. She is tolerating a diet.    Review of Systems   Constitutional:  Negative for chills and fever.   Respiratory:  Negative for cough and shortness of breath.    Cardiovascular:  Negative for chest pain and palpitations.   Gastrointestinal:  Positive for abdominal pain. Negative for nausea and vomiting.   Neurological:  Negative for dizziness and headaches.   All other systems reviewed and are negative.      Objective    Last Recorded Vitals  Blood pressure 130/89, pulse 66, temperature 36.8 °C (98.2 °F), temperature source Temporal, resp. rate 18, height 1.651 m (5' 5\"), weight 81 kg (178 lb 9.2 oz), SpO2 99 %.    Intake/Output last 3 Shifts:  I/O last 3 completed shifts:  In: 1210 (14.9 mL/kg) [P.O.:1210]  Out: 1850 (22.8 mL/kg) [Urine:1850 (0.6 mL/kg/hr)]  Weight: 81 kg     Physical Exam  Constitutional:       General: She is not in acute distress.     Appearance: Normal appearance. She is not ill-appearing.   HENT:      Head: Normocephalic and atraumatic.   Cardiovascular:      Rate and Rhythm: Normal rate and regular rhythm.   Pulmonary:      Effort: Pulmonary effort is normal. No respiratory distress.      Breath sounds: Normal breath sounds.   Abdominal:      General: There is no distension.      Palpations: Abdomen is soft.      Tenderness: There is abdominal tenderness. There is no guarding.      Comments: Mild, RLQ, no guarding   Musculoskeletal:         General: No swelling.   Skin:     General: Skin is warm and dry.   Neurological:      Mental Status: She is alert and oriented to person, place, and time. Mental status is at baseline.   Psychiatric:         Mood and Affect: Mood normal.         Behavior: Behavior normal.       Relevant Results  Results for " orders placed or performed during the hospital encounter of 10/24/23 (from the past 24 hour(s))   CBC   Result Value Ref Range    WBC 3.8 (L) 4.4 - 11.3 x10*3/uL    nRBC 0.0 0.0 - 0.0 /100 WBCs    RBC 3.23 (L) 4.00 - 5.20 x10*6/uL    Hemoglobin 8.6 (L) 12.0 - 16.0 g/dL    Hematocrit 27.9 (L) 36.0 - 46.0 %    MCV 86 80 - 100 fL    MCH 26.6 26.0 - 34.0 pg    MCHC 30.8 (L) 32.0 - 36.0 g/dL    RDW 16.2 (H) 11.5 - 14.5 %    Platelets 197 150 - 450 x10*3/uL    MPV 13.7 (H) 7.5 - 11.5 fL   Basic Metabolic Panel   Result Value Ref Range    Glucose 89 74 - 99 mg/dL    Sodium 137 136 - 145 mmol/L    Potassium 3.8 3.5 - 5.3 mmol/L    Chloride 109 (H) 98 - 107 mmol/L    Bicarbonate 23 21 - 32 mmol/L    Anion Gap 9 (L) 10 - 20 mmol/L    Urea Nitrogen 5 (L) 6 - 23 mg/dL    Creatinine 0.70 0.50 - 1.05 mg/dL    eGFR >90 >60 mL/min/1.73m*2    Calcium 8.7 8.6 - 10.3 mg/dL       CT abdomen pelvis w IV contrast    Result Date: 10/24/2023  Interpreted By:  Finkelstein, Evan, STUDY: CT ABDOMEN PELVIS W IV CONTRAST;  10/24/2023 2:03 am   INDICATION: Signs/Symptoms:right lower quadrant pain.   COMPARISON: None.   ACCESSION NUMBER(S): [id number]   ORDERING CLINICIAN: YONATAN ROMAN   TECHNIQUE: Axial CT images of the abdomen and pelvis with coronal and sagittal reconstructed images obtained after intravenous administration of 75 mL Omnipaque 350   FINDINGS: LOWER CHEST: No acute abnormality of the lung bases.   ABDOMEN:   LIVER: Normal attenuation and contour. BILE DUCTS: Normal caliber. GALLBLADDER: No calcified gallstones. No wall thickening. PORTAL VEIN: Patent SPLEEN: Unremarkable. PANCREAS: Unremarkable. ADRENALS: Unremarkable. KIDNEYS, URETERS and URINARY BLADDER: Symmetric renal enhancement. No hydronephrosis or perinephric fluid collection. Subcentimeter hypodensities in the right kidney are too small to characterize. Concentric bladder wall thickening. REPRODUCTIVE ORGANS: 2 rounded hypodensities are present in the right  lower pelvis, possibly ovarian in origin, both measuring up to approximately 3.2 cm and measuring intermediate density. There is a small amount of free pelvic fluid.   ABDOMINAL WALL: Within normal limits. PERITONEUM: No ascites, free air or fluid collection.   BOWEL: The stomach is decompressed, which limits evaluation.  The appendix is dilated in size measuring up to approximately 1.3 cm with adjacent inflammatory stranding.   VESSELS: No aortic aneurysm. RETROPERITONEUM: No pathologically enlarged retroperitoneal lymph nodes.   BONES: No acute osseous abnormality.       CT findings compatible with acute appendicitis. The appendix is dilated in size measuring up to 1.3 cm with adjacent inflammatory stranding.   2 round hypodensities present in the right lower quadrant are likely ovarian in origin measuring up to approximately 3.2 cm and measure intermediate density. Findings may represent hemorrhagic cysts with a small amount of free pelvic fluid.   Concentric bladder wall thickening. Recommend correlation with urinalysis as findings may be seen with cystitis.   MACRO: None.   Signed by: Evan Finkelstein 10/24/2023 2:16 AM Dictation workstation:   [id number]      Assessment and Plan  Principal Problem:    Acute appendicitis    38 y.o. female with likely acute appendicitis, UTI and a hemorrhagic right ovarian cyst. Operative intervention vs antibiotics alone were discussed and the patient opted for antibiotics alone. She does not feel quite ready for discharge. Will continue IV antibiotics and supportive care.    Christianne Malik MD, [facility]  General Surgery

## 2023-10-27 ENCOUNTER — PHARMACY VISIT (OUTPATIENT)
Dept: PHARMACY | Facility: CLINIC | Age: 38
End: 2023-10-27

## 2023-10-27 PROCEDURE — 99232 SBSQ HOSP IP/OBS MODERATE 35: CPT | Performed by: SURGERY

## 2023-10-27 PROCEDURE — 2500000004 HC RX 250 GENERAL PHARMACY W/ HCPCS (ALT 636 FOR OP/ED): Performed by: SURGERY

## 2023-10-27 PROCEDURE — 2500000001 HC RX 250 WO HCPCS SELF ADMINISTERED DRUGS (ALT 637 FOR MEDICARE OP): Performed by: SURGERY

## 2023-10-27 PROCEDURE — G0378 HOSPITAL OBSERVATION PER HR: HCPCS

## 2023-10-27 PROCEDURE — 96376 TX/PRO/DX INJ SAME DRUG ADON: CPT

## 2023-10-27 PROCEDURE — RXMED WILLOW AMBULATORY MEDICATION CHARGE

## 2023-10-27 RX ORDER — OXYCODONE HYDROCHLORIDE 5 MG/1
5 TABLET ORAL EVERY 8 HOURS PRN
Qty: 15 TABLET | Refills: 0 | Status: SHIPPED | OUTPATIENT
Start: 2023-10-27 | End: 2023-11-03

## 2023-10-27 RX ORDER — AMOXICILLIN AND CLAVULANATE POTASSIUM 875; 125 MG/1; MG/1
1 TABLET, FILM COATED ORAL 2 TIMES DAILY
Qty: 14 TABLET | Refills: 0 | Status: SHIPPED | OUTPATIENT
Start: 2023-10-27 | End: 2023-11-04

## 2023-10-27 RX ADMIN — ACETAMINOPHEN 650 MG: 325 TABLET ORAL at 04:35

## 2023-10-27 RX ADMIN — CEFOXITIN SODIUM 2 G: 2 POWDER, FOR SOLUTION INTRAVENOUS at 15:24

## 2023-10-27 RX ADMIN — OXYCODONE HYDROCHLORIDE 5 MG: 5 TABLET ORAL at 20:13

## 2023-10-27 RX ADMIN — CEFOXITIN SODIUM 2 G: 2 POWDER, FOR SOLUTION INTRAVENOUS at 04:37

## 2023-10-27 RX ADMIN — MORPHINE SULFATE 2 MG: 2 INJECTION, SOLUTION INTRAMUSCULAR; INTRAVENOUS at 15:24

## 2023-10-27 RX ADMIN — CEFOXITIN SODIUM 2 G: 2 POWDER, FOR SOLUTION INTRAVENOUS at 20:13

## 2023-10-27 RX ADMIN — CEFOXITIN SODIUM 2 G: 2 POWDER, FOR SOLUTION INTRAVENOUS at 09:25

## 2023-10-27 RX ADMIN — ACETAMINOPHEN 650 MG: 325 TABLET ORAL at 21:45

## 2023-10-27 RX ADMIN — OXYCODONE HYDROCHLORIDE 5 MG: 5 TABLET ORAL at 09:52

## 2023-10-27 RX ADMIN — ACETAMINOPHEN 650 MG: 325 TABLET ORAL at 15:24

## 2023-10-27 RX ADMIN — MORPHINE SULFATE 2 MG: 2 INJECTION, SOLUTION INTRAMUSCULAR; INTRAVENOUS at 06:16

## 2023-10-27 RX ADMIN — ACETAMINOPHEN 650 MG: 325 TABLET ORAL at 09:25

## 2023-10-27 ASSESSMENT — COGNITIVE AND FUNCTIONAL STATUS - GENERAL
DAILY ACTIVITIY SCORE: 24
DAILY ACTIVITIY SCORE: 24
MOBILITY SCORE: 24
DAILY ACTIVITIY SCORE: 24

## 2023-10-27 ASSESSMENT — ENCOUNTER SYMPTOMS
FEVER: 0
COUGH: 0
CHILLS: 0
DIZZINESS: 0
HEADACHES: 0
VOMITING: 0
ABDOMINAL PAIN: 1
NAUSEA: 0
PALPITATIONS: 0
SHORTNESS OF BREATH: 0

## 2023-10-27 ASSESSMENT — PAIN - FUNCTIONAL ASSESSMENT: PAIN_FUNCTIONAL_ASSESSMENT: 0-10

## 2023-10-27 ASSESSMENT — PAIN SCALES - GENERAL
PAINLEVEL_OUTOF10: 8
PAINLEVEL_OUTOF10: 7
PAINLEVEL_OUTOF10: 6
PAINLEVEL_OUTOF10: 7

## 2023-10-27 NOTE — DISCHARGE INSTRUCTIONS
Discharge Instructions    Pain  Do not drive while taking stronger pain medications (Tramadol, Percocet, Norco, Oxycodone, etc).   You may drive once you feel comfortable without stronger pain medications and can drive without any significant distractions related to your pain or surgical sites.  You may alternate acetaminophen and ibuprofen for pain control, as long as you are able to take either medication.     Diet  Resume your normal diet. Your appetite may not fully return immediately.   Please drink plenty of fluids to maintain hydration.    Follow Up  Follow up with Dr. Christianne Malik in the office per the instructions above. Please call the office to follow up sooner if there are concerns you would like to discuss.    Call Provider  If you are experiencing fever (100.4F or higher).  If you are experiencing significantly worsening pain, nausea or vomiting.  If the incision(s) appear reddened, swollen or are draining.  If you have very loose or watery bowel movements.  If you have any new concerning symptoms.

## 2023-10-27 NOTE — CARE PLAN
Problem: Fall/Injury  Goal: Not fall by end of shift  Outcome: Progressing  Goal: Be free from injury by end of the shift  Outcome: Progressing  Goal: Verbalize understanding of personal risk factors for fall in the hospital  Outcome: Progressing  Goal: Verbalize understanding of risk factor reduction measures to prevent injury from fall in the home  Outcome: Progressing  Goal: Use assistive devices by end of the shift  Outcome: Progressing  Goal: Pace activities to prevent fatigue by end of the shift  Outcome: Progressing   The patient's goals for the shift include      The clinical goals for the shift include pain mangement

## 2023-10-27 NOTE — PROGRESS NOTES
"GENERAL SURGERY PROGRESS NOTE    Nilda Trejo   1985   [id number]     Nilda Trejo is a 38 y.o. female on day 0 of admission presenting with Acute appendicitis.      Subjective  The patient feels somewhat improved but still c/o abdominal pain. She is tolerating a diet.    Review of Systems   Constitutional:  Negative for chills and fever.   Respiratory:  Negative for cough and shortness of breath.    Cardiovascular:  Negative for chest pain and palpitations.   Gastrointestinal:  Positive for abdominal pain. Negative for nausea and vomiting.   Neurological:  Negative for dizziness and headaches.   All other systems reviewed and are negative.      Objective    Last Recorded Vitals  Blood pressure (!) 145/100, pulse 70, temperature 36.8 °C (98.2 °F), temperature source Temporal, resp. rate 16, height 1.651 m (5' 5\"), weight 81 kg (178 lb 9.2 oz), SpO2 100 %.    Intake/Output last 3 Shifts:  I/O last 3 completed shifts:  In: 1360 (16.8 mL/kg) [P.O.:1360]  Out: 1350 (16.7 mL/kg) [Urine:1350 (0.5 mL/kg/hr)]  Weight: 81 kg     Physical Exam  Constitutional:       General: She is not in acute distress.     Appearance: Normal appearance. She is not ill-appearing.   HENT:      Head: Normocephalic and atraumatic.   Cardiovascular:      Rate and Rhythm: Normal rate and regular rhythm.   Pulmonary:      Effort: Pulmonary effort is normal. No respiratory distress.      Breath sounds: Normal breath sounds.   Abdominal:      General: There is no distension.      Palpations: Abdomen is soft.      Tenderness: There is abdominal tenderness. There is no guarding.      Comments: Mild, RLQ, no guarding   Musculoskeletal:         General: No swelling.   Skin:     General: Skin is warm and dry.   Neurological:      Mental Status: She is alert and oriented to person, place, and time. Mental status is at baseline.   Psychiatric:         Mood and Affect: Mood normal.         Behavior: Behavior normal.       Relevant Results  CT " abdomen pelvis w IV contrast    Result Date: 10/24/2023  Interpreted By:  Finkelstein, Evan, STUDY: CT ABDOMEN PELVIS W IV CONTRAST;  10/24/2023 2:03 am   INDICATION: Signs/Symptoms:right lower quadrant pain.   COMPARISON: None.   ACCESSION NUMBER(S): [id number]   ORDERING CLINICIAN: YONATAN ROMAN   TECHNIQUE: Axial CT images of the abdomen and pelvis with coronal and sagittal reconstructed images obtained after intravenous administration of 75 mL Omnipaque 350   FINDINGS: LOWER CHEST: No acute abnormality of the lung bases.   ABDOMEN:   LIVER: Normal attenuation and contour. BILE DUCTS: Normal caliber. GALLBLADDER: No calcified gallstones. No wall thickening. PORTAL VEIN: Patent SPLEEN: Unremarkable. PANCREAS: Unremarkable. ADRENALS: Unremarkable. KIDNEYS, URETERS and URINARY BLADDER: Symmetric renal enhancement. No hydronephrosis or perinephric fluid collection. Subcentimeter hypodensities in the right kidney are too small to characterize. Concentric bladder wall thickening. REPRODUCTIVE ORGANS: 2 rounded hypodensities are present in the right lower pelvis, possibly ovarian in origin, both measuring up to approximately 3.2 cm and measuring intermediate density. There is a small amount of free pelvic fluid.   ABDOMINAL WALL: Within normal limits. PERITONEUM: No ascites, free air or fluid collection.   BOWEL: The stomach is decompressed, which limits evaluation.  The appendix is dilated in size measuring up to approximately 1.3 cm with adjacent inflammatory stranding.   VESSELS: No aortic aneurysm. RETROPERITONEUM: No pathologically enlarged retroperitoneal lymph nodes.   BONES: No acute osseous abnormality.       CT findings compatible with acute appendicitis. The appendix is dilated in size measuring up to 1.3 cm with adjacent inflammatory stranding.   2 round hypodensities present in the right lower quadrant are likely ovarian in origin measuring up to approximately 3.2 cm and measure intermediate density.  Findings may represent hemorrhagic cysts with a small amount of free pelvic fluid.   Concentric bladder wall thickening. Recommend correlation with urinalysis as findings may be seen with cystitis.   MACRO: None.   Signed by: Evan Finkelstein 10/24/2023 2:16 AM Dictation workstation:   [id number]      Assessment and Plan  Principal Problem:    Acute appendicitis    38 y.o. female with likely acute appendicitis, UTI and a hemorrhagic right ovarian cyst. Operative intervention vs antibiotics alone were discussed and the patient opted for antibiotics alone. She does not feel quite ready for discharge. Will continue IV antibiotics and supportive care. Likely discharge tomorrow.    Christianne Malik MD, FACS  General Surgery

## 2023-10-28 VITALS
SYSTOLIC BLOOD PRESSURE: 151 MMHG | HEIGHT: 65 IN | TEMPERATURE: 97.7 F | BODY MASS INDEX: 29.75 KG/M2 | HEART RATE: 68 BPM | RESPIRATION RATE: 18 BRPM | WEIGHT: 178.57 LBS | OXYGEN SATURATION: 100 % | DIASTOLIC BLOOD PRESSURE: 99 MMHG

## 2023-10-28 PROCEDURE — G0378 HOSPITAL OBSERVATION PER HR: HCPCS

## 2023-10-28 PROCEDURE — 99238 HOSP IP/OBS DSCHRG MGMT 30/<: CPT | Performed by: SURGERY

## 2023-10-28 PROCEDURE — 94760 N-INVAS EAR/PLS OXIMETRY 1: CPT

## 2023-10-28 PROCEDURE — 2500000004 HC RX 250 GENERAL PHARMACY W/ HCPCS (ALT 636 FOR OP/ED): Performed by: SURGERY

## 2023-10-28 PROCEDURE — 2500000001 HC RX 250 WO HCPCS SELF ADMINISTERED DRUGS (ALT 637 FOR MEDICARE OP): Performed by: SURGERY

## 2023-10-28 RX ADMIN — ACETAMINOPHEN 650 MG: 325 TABLET ORAL at 03:10

## 2023-10-28 RX ADMIN — ACETAMINOPHEN 650 MG: 325 TABLET ORAL at 09:12

## 2023-10-28 RX ADMIN — CEFOXITIN SODIUM 2 G: 2 POWDER, FOR SOLUTION INTRAVENOUS at 03:10

## 2023-10-28 RX ADMIN — CEFOXITIN SODIUM 2 G: 2 POWDER, FOR SOLUTION INTRAVENOUS at 09:12

## 2023-10-28 RX ADMIN — OXYCODONE HYDROCHLORIDE 5 MG: 5 TABLET ORAL at 06:05

## 2023-10-28 ASSESSMENT — COGNITIVE AND FUNCTIONAL STATUS - GENERAL
DAILY ACTIVITIY SCORE: 24
MOBILITY SCORE: 24

## 2023-10-28 ASSESSMENT — PAIN SCALES - GENERAL
PAINLEVEL_OUTOF10: 4
PAINLEVEL_OUTOF10: 4
PAINLEVEL_OUTOF10: 6

## 2023-10-28 ASSESSMENT — PAIN - FUNCTIONAL ASSESSMENT: PAIN_FUNCTIONAL_ASSESSMENT: 0-10

## 2023-10-28 NOTE — DISCHARGE SUMMARY
Discharge Diagnosis  Acute appendicitis    Issues Requiring Follow-Up  [facility]    Test Results Pending At Discharge  Pending Labs       No current pending labs.            Hospital Course   She was admitted with a diagnosis of uncomplicated acute appendicitis.  She had elected not to undergo surgical intervention was started on IV antibiotics.  Eventually, her pain was controlled with p.o. pain medication.  On the day of discharge, she was tolerating a diet without any nausea or vomiting, and had return of normal bowel function without diarrhea or blood.  She remained afebrile.  She was concerned about her diastolic blood pressure, but only had 1 episode of a diastolic blood pressure greater than 100.  After receiving optimal therapy in the hospital, she was discharged home with oral antibiotics to complete a course for the acute appendicitis.  She was given Dr. Malik's office information for follow-up if she decides not to go to [facility] for [facility].    Pertinent Physical Exam At Time of Discharge  Physical Exam  General: Well-developed, well-nourished and in no acute distress.  Head: Normocephalic. Atraumatic.  Neck/thyroid: Neck is supple.   Eyes: Pupils equal round and reactive to light. Conjunctiva normal.  ENMT: No masses or deformity of external nose. External ears without masses.  Respiratory/Chest:  Normal respiratory effort.  Cardiovascular: Regular rate and rhythm.   Abdomen: Soft, nondistended.  Minimal right lower quadrant tenderness without peritoneal signs.  Abdomen benign.  Musculoskeletal: Joints and limbs are grossly normal.   Neuro: Oriented to person, place and time.   Psych: Normal mood and affect.  Home Medications     Medication List      START taking these medications     amoxicillin-pot clavulanate 875-125 mg tablet; Commonly known as:   Augmentin; Take 1 tablet (875 mg) by mouth 2 times a day for 7 days.    oxyCODONE 5 mg immediate release tablet; Commonly known as: Roxicodone;   Take 1 tablet (5 mg) by mouth every 8 hours if needed for moderate pain (4   - 6) for up to 7 days.     CONTINUE taking these medications     ferrous sulfate 325 (65 Fe) MG EC tablet   lactobacillus acidophilus & bulgar 1 million cell chewable tablet;   Commonly known as: Lactinex   loratadine 10 mg tablet; Commonly known as: Claritin   Vitamin D3 50 MCG (2000 UT) tablet; Generic drug: cholecalciferol       Outpatient Follow-Up  Follow-[facility] with Dr. Malik or [facility] regarding [facility].    Janet Blackwood MD

## 2023-10-28 NOTE — CARE PLAN
The patient's goals for the shift include      The clinical goals for the shift include pt will have adequate pain control by end of shift    Over the shift, the patient did not make progress toward the following goals. Barriers to progression include na. Recommendations to address these barriers include na.

## 2023-10-28 NOTE — CARE PLAN
Pt being discharged, went over paper work, medications and follow up appointments. Also went over pt concerns for increase in BP over her stay. Talked to dr. Blackwood in person about pt concerns unfortunately because pt does not want surgery and her blood pressures are stable, she suggested to follow up outpatient. Pt aware and cooperative, IV removed pt remained fall free, awaiting ride.